# Patient Record
Sex: MALE | Race: WHITE | NOT HISPANIC OR LATINO | URBAN - METROPOLITAN AREA
[De-identification: names, ages, dates, MRNs, and addresses within clinical notes are randomized per-mention and may not be internally consistent; named-entity substitution may affect disease eponyms.]

---

## 2017-10-20 ENCOUNTER — HOSPITAL ENCOUNTER (OUTPATIENT)
Facility: MEDICAL CENTER | Age: 42
End: 2017-10-20
Attending: EMERGENCY MEDICINE
Payer: COMMERCIAL

## 2017-10-20 ENCOUNTER — OFFICE VISIT (OUTPATIENT)
Dept: URGENT CARE | Facility: CLINIC | Age: 42
End: 2017-10-20
Payer: COMMERCIAL

## 2017-10-20 VITALS
HEART RATE: 120 BPM | WEIGHT: 300 LBS | OXYGEN SATURATION: 94 % | DIASTOLIC BLOOD PRESSURE: 80 MMHG | BODY MASS INDEX: 44.43 KG/M2 | SYSTOLIC BLOOD PRESSURE: 130 MMHG | RESPIRATION RATE: 18 BRPM | HEIGHT: 69 IN | TEMPERATURE: 98.7 F

## 2017-10-20 DIAGNOSIS — L03.115 CELLULITIS OF RIGHT LEG: ICD-10-CM

## 2017-10-20 DIAGNOSIS — L02.415 ABSCESS OF RIGHT THIGH: ICD-10-CM

## 2017-10-20 LAB
GRAM STN SPEC: NORMAL
SIGNIFICANT IND 70042: NORMAL
SITE SITE: NORMAL
SOURCE SOURCE: NORMAL

## 2017-10-20 PROCEDURE — 10061 I&D ABSCESS COMP/MULTIPLE: CPT | Performed by: EMERGENCY MEDICINE

## 2017-10-20 PROCEDURE — 87077 CULTURE AEROBIC IDENTIFY: CPT

## 2017-10-20 PROCEDURE — 87205 SMEAR GRAM STAIN: CPT

## 2017-10-20 PROCEDURE — 87070 CULTURE OTHR SPECIMN AEROBIC: CPT

## 2017-10-20 PROCEDURE — 87075 CULTR BACTERIA EXCEPT BLOOD: CPT

## 2017-10-20 RX ORDER — AMLODIPINE BESYLATE 5 MG/1
TABLET ORAL
Refills: 0 | COMMUNITY
Start: 2017-09-22

## 2017-10-20 RX ORDER — BENAZEPRIL HYDROCHLORIDE 10 MG/1
TABLET ORAL
Refills: 7 | COMMUNITY
Start: 2017-09-22

## 2017-10-20 RX ORDER — ASPIRIN 81 MG/1
81 TABLET, CHEWABLE ORAL DAILY
COMMUNITY

## 2017-10-20 RX ORDER — ATORVASTATIN CALCIUM 40 MG/1
TABLET, FILM COATED ORAL
Refills: 3 | COMMUNITY
Start: 2017-09-22

## 2017-10-20 RX ORDER — SULFAMETHOXAZOLE AND TRIMETHOPRIM 800; 160 MG/1; MG/1
TABLET ORAL
Refills: 0 | COMMUNITY
Start: 2017-08-03

## 2017-10-20 ASSESSMENT — ENCOUNTER SYMPTOMS
FOCAL WEAKNESS: 0
TINGLING: 0
FEVER: 0
NUMBNESS: 0

## 2017-10-20 NOTE — PATIENT INSTRUCTIONS
Use over-the-counter pain reliever, such as acetaminophen (Tylenol), ibuprofen (Advil, Motrin) or naproxen (Aleve) as needed; follow package directions for dosing.  Finish the course of Bactrim antibiotic. Use an oral probiotic daily, such as Culturelle, Align, or yogurt to reduce gastrointestinal symptoms.  Test results will be available in the next 2-3 days; the clinic will contact you with results.  Plan recheck with physician in 2-3 days unless resolving; remove the gauze packing in 2 days.    Abscess  Care After  An abscess (also called a boil or furuncle) is an infected area that contains a collection of pus. Signs and symptoms of an abscess include pain, tenderness, redness, or hardness, or you may feel a moveable soft area under your skin. An abscess can occur anywhere in the body. The infection may spread to surrounding tissues causing cellulitis. A cut (incision) by the surgeon was made over your abscess and the pus was drained out. Gauze may have been packed into the space to provide a drain that will allow the cavity to heal from the inside outwards. The boil may be painful for 5 to 7 days. Most people with a boil do not have high fevers. Your abscess, if seen early, may not have localized, and may not have been lanced. If not, another appointment may be required for this if it does not get better on its own or with medications.  HOME CARE INSTRUCTIONS   · Only take over-the-counter or prescription medicines for pain, discomfort, or fever as directed by your caregiver.  · When you bathe, soak and then remove gauze or iodoform packs at least daily or as directed by your caregiver. You may then wash the wound gently with mild soapy water. Repack with gauze or do as your caregiver directs.  SEEK IMMEDIATE MEDICAL CARE IF:   · You develop increased pain, swelling, redness, drainage, or bleeding in the wound site.  · You develop signs of generalized infection including muscle aches, chills, fever, or a  general ill feeling.  · An oral temperature above 102° F (38.9° C) develops, not controlled by medication.  See your caregiver for a recheck if you develop any of the symptoms described above. If medications (antibiotics) were prescribed, take them as directed.  Document Released: 07/06/2006 Document Revised: 03/11/2013 Document Reviewed: 03/02/2009  [employer]® Patient Information ©2014 [employer].

## 2017-10-20 NOTE — PROGRESS NOTES
"Subjective:      Ac Davis is a 42 y.o. male who presents with Wound Infection (Right side thigh abcess X 4 days )            Wound Infection   This is a new problem. The current episode started in the past 7 days. The problem occurs daily. The problem has been rapidly worsening. Associated symptoms include a rash. Pertinent negatives include no fever or numbness. Exacerbated by: pressure. Treatments tried: Bactrim x 1 day. The treatment provided no relief.   Past medical history significant for prior abscess episodes.    Review of Systems   Constitutional: Negative for fever.   Musculoskeletal:        No pain proximal or distal to the site.   Skin: Positive for rash. Negative for itching.   Neurological: Negative for tingling, focal weakness and numbness.     PMH:  has no past medical history on file.  MEDS:   Current Outpatient Prescriptions:   •  amlodipine (NORVASC) 5 MG Tab, TK 1 T PO QD, Disp: , Rfl: 0  •  atorvastatin (LIPITOR) 40 MG Tab, TK 1 T PO  QD, Disp: , Rfl: 3  •  benazepril (LOTENSIN) 10 MG Tab, TK 1 T PO QD, Disp: , Rfl: 7  •  sulfamethoxazole-trimethoprim (BACTRIM DS) 800-160 MG tablet, TK 1 T PO BID FOR 10 DAYS, Disp: , Rfl: 0  •  aspirin (ASA) 81 MG Chew Tab chewable tablet, Take 81 mg by mouth every day., Disp: , Rfl:   ALLERGIES: No Known Allergies  SURGHX: History reviewed. No pertinent surgical history.  SOCHX:  reports that he has been smoking.  He has been smoking about 1.00 pack per day. He has never used smokeless tobacco. He reports that he drinks alcohol. He reports that he uses drugs, including Marijuana and Cocaine.  FH: family history is not on file.       Objective:     /80   Pulse (!) 120   Temp 37.1 °C (98.7 °F)   Resp 18   Ht 1.753 m (5' 9\")   Wt (!) 136.1 kg (300 lb)   SpO2 94%   BMI 44.30 kg/m²      Physical Exam   Constitutional: He is oriented to person, place, and time. He appears well-developed and well-nourished. He is cooperative.  Non-toxic " "appearance. He does not appear ill.   Cardiovascular:   No significant pedal edema. Capillary refill is normal.   Lymphadenopathy:        Right: Inguinal adenopathy present.   Neurological: He is alert and oriented to person, place, and time.   Distal sensation to light touch and pressure intact.   Skin: Skin is warm and dry. Lesion and rash noted.        Psychiatric: He has a normal mood and affect.          Procedure: Incision and Drainage  -Risks, benefits, and alternatives discussed.  -Clean/sterile technique throughout  -Local anesthesia with 1% lidocaine with epinephrine  -Incision with #11 blade into fluctuant area with purulent material expressed  -Culture obtained and packaged for lab  -Cavity probed with hemostat; no loculations  -Loosely packed with 1/4\" gauze  -Minimal bleeding with good hemostasis achieved  -The patient tolerated the procedure well     Patient notes that he will be traveling in the next several days; has had experience with removing gauze packing with prior abscesses. Advised plan for leaving gauze in place for 48 hours; advised recheck at that time if redness and swelling not resolving.  Assessment/Plan:     1. Abscess of right thigh  I & D  Wound culture  Warm compresses, OTC analgesia.    2. Cellulitis of right leg  Continue Bactrim course pending culture      "

## 2017-10-22 LAB
BACTERIA WND AEROBE CULT: ABNORMAL
GRAM STN SPEC: ABNORMAL
SIGNIFICANT IND 70042: ABNORMAL
SITE SITE: ABNORMAL
SOURCE SOURCE: ABNORMAL

## 2017-10-23 LAB
BACTERIA SPEC ANAEROBE CULT: NORMAL
SIGNIFICANT IND 70042: NORMAL
SITE SITE: NORMAL
SOURCE SOURCE: NORMAL